# Patient Record
Sex: FEMALE | Race: AMERICAN INDIAN OR ALASKA NATIVE
[De-identification: names, ages, dates, MRNs, and addresses within clinical notes are randomized per-mention and may not be internally consistent; named-entity substitution may affect disease eponyms.]

---

## 2019-10-16 NOTE — EMERGENCY DEPARTMENT REPORT
ED Female  HPI





- General


Chief complaint: Urogenital-Female


Stated complaint: VAG PAIN


Time Seen by Provider: 10/16/19 05:10


Source: patient


Mode of arrival: Ambulatory


Limitations: No Limitations





- History of Present Illness


Initial comments: 





Patient is a 19-year-old female presents emergency room with complaints of 

vaginal pain that began 3 days ago.  she has associated white vaginal discharge,

vaginal itching, dysuria.  She denies any fever, nausea, vomiting, diarrhea, 

chills, any other symptoms.  States she has sexually active and does not use 

protection.  Patient denies any history of STDs.  She denies any past medical 

history or allergies medications.  States that she has a nexplanon for birth 

control.





- Related Data


                                  Previous Rx's











 Medication  Instructions  Recorded  Last Taken  Type


 


Fluconazole [Diflucan TAB] 150 mg PO ONCE #1 tablet 10/16/19 Unknown Rx


 


metroNIDAZOLE [Flagyl] 500 mg PO BID 7 Days #14 tab 10/16/19 Unknown Rx











                                    Allergies











Allergy/AdvReac Type Severity Reaction Status Date / Time


 


No Known Allergies Allergy   Unverified 10/16/19 04:18














ED Review of Systems


ROS: 


Stated complaint: VAG PAIN


Other details as noted in HPI





Comment: All other systems reviewed and negative





ED Past Medical Hx





- Past Medical History


Previous Medical History?: No





- Surgical History


Past Surgical History?: No





- Social History


Smoking Status: Never Smoker





- Medications


Home Medications: 


                                Home Medications











 Medication  Instructions  Recorded  Confirmed  Last Taken  Type


 


Fluconazole [Diflucan TAB] 150 mg PO ONCE #1 tablet 10/16/19  Unknown Rx


 


metroNIDAZOLE [Flagyl] 500 mg PO BID 7 Days #14 tab 10/16/19  Unknown Rx














ED Physical Exam





- General


Limitations: No Limitations


General appearance: alert, in no apparent distress





- Head


Head exam: Present: atraumatic, normocephalic





- Eye


Eye exam: Present: normal appearance





- ENT


ENT exam: Present: mucous membranes moist





- Respiratory


Respiratory exam: Present: normal lung sounds bilaterally.  Absent: respiratory 

distress, wheezes, rales, rhonchi, stridor, chest wall tenderness, accessory 

muscle use, decreased breath sounds, prolonged expiratory





- Cardiovascular


Cardiovascular Exam: Present: regular rate, normal rhythm, normal heart sounds. 

 Absent: systolic murmur, diastolic murmur, rubs, gallop





- GI/Abdominal


GI/Abdominal exam: Present: soft, normal bowel sounds.  Absent: distended, 

tenderness, guarding, rebound, rigid





- 


External exam: Present: normal external exam.  Absent: erythema, swelling, 

lesions, lacerations, ecchymosis, bleeding


Speculum exam: Present: vaginal discharge (white), cervical discharge (white), 

other (chaperone: ANNIE Abraham).  Absent: vaginal bleeding, foreign body, tissue, 

laceration


Bi-manual exam: Present: normal bi-manual exam.  Absent: cervical motion 

tendernes, adnexal tenderness, adnexal mass





- Neurological Exam


Neurological exam: Present: alert, oriented X3





- Psychiatric


Psychiatric exam: Present: normal affect, normal mood





- Skin


Skin exam: Present: warm, dry, intact





ED Course


                                   Vital Signs











  10/16/19 10/16/19 10/16/19





  04:10 06:33 07:30


 


Temperature 98 F  


 


Pulse Rate 108 H  93 H


 


Respiratory 18 18 18





Rate   


 


Blood Pressure 122/67  


 


Blood Pressure   117/82





[Left]   


 


O2 Sat by Pulse 97  99





Oximetry   














ED Medical Decision Making





- Lab Data








                                   Lab Results











  10/16/19 Range/Units





  04:35 


 


Urine Color  Yellow  (Yellow)  


 


Urine Turbidity  Clear  (Clear)  


 


Urine pH  6.0  (5.0-7.0)  


 


Ur Specific Gravity  1.025  (1.003-1.030)  


 


Urine Protein  <15 mg/dl  (Negative)  mg/dL


 


Urine Glucose (UA)  Neg  (Negative)  mg/dL


 


Urine Ketones  Neg  (Negative)  mg/dL


 


Urine Blood  Neg  (Negative)  


 


Urine Nitrite  Neg  (Negative)  


 


Urine Bilirubin  Neg  (Negative)  


 


Urine Urobilinogen  < 2.0  (<2.0)  mg/dL


 


Ur Leukocyte Esterase  Mod  (Negative)  


 


Urine WBC (Auto)  6.0  (0.0-6.0)  /HPF


 


Urine RBC (Auto)  5.0  (0.0-6.0)  /HPF


 


U Epithel Cells (Auto)  5.0  (0-13.0)  /HPF


 


Urine Bacteria (Auto)  1+  (Negative)  /HPF


 


Urine Mucus  Few  /HPF


 


Urine HCG, Qual  Negative  (Negative)  














- Medical Decision Making





Patient is a 19-year-old female presents emergency room with complaints of va

ginal pain that began 3 days ago.  she has associated white vaginal discharge, 

vaginal itching, dysuria.  She denies any fever, nausea, vomiting, diarrhea, 

chills, any other symptoms.  States she has sexually active and does not use 

protection.  Patient denies any history of STDs.  She denies any past medical 

history or allergies medications.  States that she has a


nexplanon for birth control. VSS. no abd tenderness on exam. white discharge 

present on pelvic examination, no CMT, no adnexal tenderness/masses, no clinical

 signs of PID or TOA. UA without evidence of UTI. urine preg is negative.  G/C 

swab sent.  pt prophylactically treated for G/C with azithromycin and 

ceftriaxone.  Wet prep shows yeast and BV.  pt given prescription for Flagyl and

 fluconazole.  advised patient to take medication as prescribed.  Do not drink 

alcohol while taking medication.  abstain from sexual intercourse for 10 days.  

Have partner tested and treated as well.  please be seen by the OB/GYN or health

 department department for further STD testing.  Return to the emergency room 

for any new or worsening symptoms.  please go to medical records in one week 

with your 's license for results of your tests but you have already been 

treated for these. 





- Differential Diagnosis


STD, vaginitis, yeast, BV, UTI


Critical care attestation.: 


If time is entered above; I have spent that time in minutes in the direct care 

of this critically ill patient, excluding procedure time.








ED Disposition


Clinical Impression: 


 Bacterial vaginosis, Vulvovaginal candidiasis





Disposition: DC-01 TO HOME OR SELFCARE


Is pt being admited?: No


Does the pt Need Aspirin: No


Condition: Stable


Instructions:  Bacterial Vaginosis (ED), Sexually Transmitted Diseases (ED), 

Safe Sex (ED), Vulvovaginal Candidiasis (ED)


Additional Instructions: 


take medication as prescribed.  Do not drink alcohol while taking medication.  

abstain from sexual intercourse for 10 days.  Have partner tested and treated as

 well.  please be seen by the OB/GYN or health department department for further

 STD testing.  Return to the emergency room for any new or worsening symptoms.  

please go to medical records in one week with your 's license for results 

of your tests but you have already been treated for these. 


Prescriptions: 


Fluconazole [Diflucan TAB] 150 mg PO ONCE #1 tablet


metroNIDAZOLE [Flagyl] 500 mg PO BID 7 Days #14 tab


Referrals: 


[facility] [[facility] - [facility] Days


[facility] [Provider Group] - 2-3 Days


Forms:  STI Treatment and Prevention, Work/School Release Form


Time of Disposition: 06:55


Print Language: ENGLISH

## 2020-01-16 ENCOUNTER — HOSPITAL ENCOUNTER (EMERGENCY)
Dept: HOSPITAL 5 - ED | Age: 20
Discharge: HOME | End: 2020-01-16
Payer: COMMERCIAL

## 2020-01-16 VITALS — SYSTOLIC BLOOD PRESSURE: 109 MMHG | DIASTOLIC BLOOD PRESSURE: 65 MMHG

## 2020-01-16 DIAGNOSIS — R10.2: Primary | ICD-10-CM

## 2020-01-16 DIAGNOSIS — R11.0: ICD-10-CM

## 2020-01-16 DIAGNOSIS — N89.8: ICD-10-CM

## 2020-01-16 LAB
BILIRUB UR QL STRIP: (no result)
BLOOD UR QL VISUAL: (no result)
MUCOUS THREADS #/AREA URNS HPF: (no result) /HPF
PH UR STRIP: 6 [PH] (ref 5–7)
PROT UR STRIP-MCNC: (no result) MG/DL
RBC #/AREA URNS HPF: 2 /HPF (ref 0–6)
UROBILINOGEN UR-MCNC: < 2 MG/DL (ref ?–2)
WBC #/AREA URNS HPF: < 1 /HPF (ref 0–6)

## 2020-01-16 PROCEDURE — 87210 SMEAR WET MOUNT SALINE/INK: CPT

## 2020-01-16 PROCEDURE — 81025 URINE PREGNANCY TEST: CPT

## 2020-01-16 PROCEDURE — 81001 URINALYSIS AUTO W/SCOPE: CPT

## 2020-01-16 PROCEDURE — 87591 N.GONORRHOEAE DNA AMP PROB: CPT

## 2020-01-16 NOTE — EMERGENCY DEPARTMENT REPORT
Blank Doc





- Documentation


Documentation: 





19-year-old female that presents with vaginal discharge. 





This initial assessment/diagnostic orders/clinical plan/treatment(s) is/are 

subject to change based on patient's health status, clinical progression and re-

assessment by fellow clinical providers in the ED.  Further treatment and workup

at subsequent clinical providers discretion.  Patient/guardians urged not to 

elope from the ED as their condition may be serious if not clinically assessed 

and managed.  Initial orders include:


1- Patient sent to [facility] for further evaluation and treatment


2- UA


3- pelvic exam to be done

## 2020-01-16 NOTE — EMERGENCY DEPARTMENT REPORT
ED Female  HPI





- General


Chief complaint: Urogenital-Female


Stated complaint: LFT SIDE PAIN/VAGINAL DISCOMFORT


Time Seen by Provider: 20 14:28


Source: patient


Mode of arrival: Ambulatory


Limitations: No Limitations





- History of Present Illness


Initial comments: 





19-year-old  female presents to the emergency room for concerns 

of a bacterial infection with mild abdominal pain and upper left arm problem.  

Patient states that this been going on for 1 month.  Patient does admit to some 

nausea but denies any vomiting.  Patient reports her last menstrual.  Was 

2020.  Patient reports she's had been taking ibuprofen.  Patient states 

her left arm has verbal control and it has been in there for 7 years and she 

wants it out.  Patient is  0.  Does admit some mild vaginal discharge.  

Denies any dysuria denies any hematuria


Onset/Timin


-: month(s)


Location: suprapubic, other (left upper arm. )


Radiation: non-radiating


Severity: mild


Severity scale (0 -10): 1


Improves with: none


Worsens with: none


Are you Pregnant Now?: No


Last Menstrual Period: 20


EDC: 10/08/20


Associated Symptoms: vaginal discharge (mild), nausea/vomiting (nausea).  

denies: fever/chills





- Related Data


Sexually active: Yes


: 0


                                  Previous Rx's











 Medication  Instructions  Recorded  Last Taken  Type


 


Fluconazole [Diflucan TAB] 150 mg PO ONCE #1 tablet 10/16/19 Unknown Rx


 


metroNIDAZOLE [Flagyl] 500 mg PO BID 7 Days #14 tab 10/16/19 Unknown Rx











                                    Allergies











Allergy/AdvReac Type Severity Reaction Status Date / Time


 


No Known Allergies Allergy   Unverified 10/16/19 04:18














ED Review of Systems


ROS: 


Stated complaint: LFT SIDE PAIN/VAGINAL DISCOMFORT


Other details as noted in HPI





Comment: All other systems reviewed and negative





ED Past Medical Hx





- Past Medical History


Previous Medical History?: No





- Surgical History


Past Surgical History?: No





- Social History


Smoking Status: Never Smoker


Substance Use Type: None





- Medications


Home Medications: 


                                Home Medications











 Medication  Instructions  Recorded  Confirmed  Last Taken  Type


 


Fluconazole [Diflucan TAB] 150 mg PO ONCE #1 tablet 10/16/19  Unknown Rx


 


metroNIDAZOLE [Flagyl] 500 mg PO BID 7 Days #14 tab 10/16/19  Unknown Rx














ED Physical Exam





- General


Limitations: No Limitations


General appearance: alert, in no apparent distress





- Head


Head exam: Present: atraumatic, normocephalic





- Eye


Eye exam: Present: normal appearance





- ENT


ENT exam: Present: mucous membranes moist





- Neck


Neck exam: Present: normal inspection, full ROM





- GI/Abdominal


GI/Abdominal exam: Present: soft, normal bowel sounds





- 


External exam: Present: normal external exam


Speculum exam: Present: cervical discharge


Bi-manual exam: Present: normal bi-manual exam





- Extremities Exam


Extremities exam: Present: normal inspection, full ROM





- Expanded Upper Extremity Exam


  ** Left


Shoulder Exam: Present: normal inspection


Upper Arm exam: Present: normal inspection, full ROM.  Absent: tenderness, 

swelling, erythema


Elbow exam: Present: normal inspection, full ROM





- Back Exam


Back exam: Present: normal inspection





- Neurological Exam


Neurological exam: Present: alert, oriented X3, normal gait





- Psychiatric


Psychiatric exam: Present: normal affect, normal mood





- Skin


Skin exam: Present: warm, dry, intact, normal color.  Absent: rash





ED Course





                                   Vital Signs











  20





  14:17 14:28 18:25


 


Temperature 98.3 F 98.3 F 97.8 F


 


Pulse Rate 101 H 104 H 64


 


Respiratory 16 14 15





Rate   


 


Blood Pressure 122/70 122/70 109/65


 


O2 Sat by Pulse 100 100 100





Oximetry   














ED Medical Decision Making





- Medical Decision Making





19-year-old  female presents to the emergency room for concerns 

of a bacterial infection with mild abdominal pain and upper left arm problem.  

Patient states that this been going on for 1 month.  Patient does admit to some 

nausea but denies any vomiting.  Patient reports her last menstrual.  Was 

2020.  Patient reports she's had been taking ibuprofen.  Patient states 

her left arm has verbal control and it has been in there for 7 years and she 

wants it out.  Patient is  0.  Does admit some mild vaginal discharge.  

Denies any dysuria denies any hematuria.











Wet prep negative, urinalysis negative, hCG urine negative.





Discussed with patient to follow up at the health department for full STD 

workup.  Also discussed the patient's  [facility] picture ID to medical records on

 the ground floor to obtain her results for gonorrhea and chlamydia.  Discussed 

with patient her birth control in her left upper arm needs to be follow-up with 

OB/GYN.  Referral will be placed.  Patient verbalized understanding.


Critical care attestation.: 


If time is entered above; I have spent that time in minutes in the direct care 

of this critically ill patient, excluding procedure time.








ED Disposition


Clinical Impression: 


 Normal pelvic exam





Disposition:  TO HOME OR SELFCARE


Is pt being admited?: No


Does the pt Need Aspirin: No


Condition: Stable


Additional Instructions: 


Negative pelvic exam negative wet prep, negative urinalysis.  Follow-up with the

 health department or OB/GYN.


Referrals: 


[facility] [Primary Care Provider] - 3-5 Days


[facility] [Provider Group] - 3-5 Days


[facility] [Provider Group] - 3-5 Days


[facility] [Outside] - 3-5 Days


[facility] [Outside] - 3-5 Days


Forms:  Work/School Release Form(ED)